# Patient Record
Sex: FEMALE | Race: WHITE | NOT HISPANIC OR LATINO | Employment: OTHER | URBAN - METROPOLITAN AREA
[De-identification: names, ages, dates, MRNs, and addresses within clinical notes are randomized per-mention and may not be internally consistent; named-entity substitution may affect disease eponyms.]

---

## 2017-01-04 LAB
EXT ALBUMIN: 3.8
EXT ALKALINE PHOSPHATASE: 61
EXT ALT: 30
EXT AST: 21
EXT BILIRUBIN TOTAL: 0.4
EXT BUN: 26
EXT CALCIUM: 9.3
EXT CHLORIDE: 107
EXT CO2: 24
EXT CREATININE: 1.2 MG/DL
EXT EOSINOPHIL%: 1.7
EXT GLUCOSE: 74
EXT HEMATOCRIT: 36.9
EXT HEMOGLOBIN: 11.7
EXT LYMPH%: 12.9
EXT MONOCYTES%: 5
EXT PLATELETS: 265
EXT POTASSIUM: 4.1
EXT PROTEIN TOTAL: 6.3
EXT SEGS%: 77.4
EXT SODIUM: 141 MMOL/L
EXT TACROLIMUS LVL: 4.4
EXT WBC: 7.1
HBV SURFACE AG SERPL QL IA: NEGATIVE
HEPATITIS B VIRAL DNA - QUANTITATIVE: NOT DETECTED

## 2017-01-09 ENCOUNTER — PATIENT MESSAGE (OUTPATIENT)
Dept: TRANSPLANT | Facility: CLINIC | Age: 52
End: 2017-01-09

## 2017-01-09 RX ORDER — TACROLIMUS 1 MG/1
CAPSULE ORAL
Qty: 810 CAPSULE | Refills: 3 | Status: SHIPPED | OUTPATIENT
Start: 2017-01-09 | End: 2017-04-28 | Stop reason: SDUPTHER

## 2017-01-09 NOTE — TELEPHONE ENCOUNTER
Informed pt labs reviewed with Dr. Barfield and are stable, instructed pt to increase prograf to 5 mg in am and 4 mg in pm. Will repeat in 4 weeks on 2/6.

## 2017-01-12 ENCOUNTER — PATIENT MESSAGE (OUTPATIENT)
Dept: TRANSPLANT | Facility: CLINIC | Age: 52
End: 2017-01-12

## 2017-01-19 ENCOUNTER — PATIENT MESSAGE (OUTPATIENT)
Dept: TRANSPLANT | Facility: CLINIC | Age: 52
End: 2017-01-19

## 2017-02-07 ENCOUNTER — PATIENT MESSAGE (OUTPATIENT)
Dept: TRANSPLANT | Facility: CLINIC | Age: 52
End: 2017-02-07

## 2017-02-15 LAB
EXT ALBUMIN: 3.9
EXT ALKALINE PHOSPHATASE: 76
EXT ALT: 28
EXT AST: 21
EXT BASOPHIL%: 1.2
EXT BILIRUBIN TOTAL: 0.5
EXT BUN: 19
EXT CALCIUM: 9.5
EXT CHLORIDE: 107
EXT CO2: 28
EXT CREATININE: 1.2 MG/DL
EXT EOSINOPHIL%: 2.6
EXT GLUCOSE: 81
EXT HBV DNA, QUALITATIVE PCR: NOT DETECTED
EXT HEMATOCRIT: 38.3
EXT HEMOGLOBIN: 12.1
EXT HEP B S AB: NORMAL
EXT LYMPH%: 24.9
EXT MONOCYTES%: 10
EXT PLATELETS: 269
EXT POTASSIUM: 4.1
EXT PROTEIN TOTAL: 6.2
EXT SEGS%: 61.3
EXT SODIUM: 143 MMOL/L
EXT TACROLIMUS LVL: 6
EXT WBC: 4.5

## 2017-02-16 ENCOUNTER — PATIENT MESSAGE (OUTPATIENT)
Dept: TRANSPLANT | Facility: CLINIC | Age: 52
End: 2017-02-16

## 2017-02-17 ENCOUNTER — TELEPHONE (OUTPATIENT)
Dept: TRANSPLANT | Facility: CLINIC | Age: 52
End: 2017-02-17

## 2017-02-17 ENCOUNTER — PATIENT MESSAGE (OUTPATIENT)
Dept: TRANSPLANT | Facility: CLINIC | Age: 52
End: 2017-02-17

## 2017-02-17 RX ORDER — PANTOPRAZOLE SODIUM 40 MG/1
40 TABLET, DELAYED RELEASE ORAL DAILY
Qty: 90 TABLET | Refills: 3 | Status: SHIPPED | OUTPATIENT
Start: 2017-02-17

## 2017-02-17 RX ORDER — TENOFOVIR DISOPROXIL FUMARATE 300 MG/1
300 TABLET, FILM COATED ORAL DAILY
Qty: 90 TABLET | Refills: 3 | Status: SHIPPED | OUTPATIENT
Start: 2017-02-17 | End: 2017-10-02 | Stop reason: SDUPTHER

## 2017-02-17 RX ORDER — MYCOPHENOLATE MOFETIL 250 MG/1
CAPSULE ORAL
Qty: 360 CAPSULE | Refills: 3 | Status: SHIPPED | OUTPATIENT
Start: 2017-02-17 | End: 2017-08-30

## 2017-02-17 NOTE — TELEPHONE ENCOUNTER
----- Message from Roseanne Barfield MD sent at 2/16/2017  4:58 PM CST -----  The Labs are stable - please let patient know.

## 2017-02-21 ENCOUNTER — TELEPHONE (OUTPATIENT)
Dept: TRANSPLANT | Facility: CLINIC | Age: 52
End: 2017-02-21

## 2017-02-21 NOTE — TELEPHONE ENCOUNTER
----- Message from Kim Robison sent at 2/21/2017  2:07 PM CST -----  Contact: pt  Wants to know if she is ok to start looking for work again please call her @ # [phone].

## 2017-03-06 ENCOUNTER — OFFICE VISIT (OUTPATIENT)
Dept: TRANSPLANT | Facility: CLINIC | Age: 52
End: 2017-03-06
Payer: MEDICAID

## 2017-03-06 ENCOUNTER — HOSPITAL ENCOUNTER (OUTPATIENT)
Dept: RADIOLOGY | Facility: HOSPITAL | Age: 52
Discharge: HOME | End: 2017-03-06
Attending: INTERNAL MEDICINE
Payer: MEDICAID

## 2017-03-06 VITALS
HEART RATE: 68 BPM | HEIGHT: 64 IN | WEIGHT: 117.75 LBS | RESPIRATION RATE: 18 BRPM | DIASTOLIC BLOOD PRESSURE: 78 MMHG | BODY MASS INDEX: 20.1 KG/M2 | SYSTOLIC BLOOD PRESSURE: 114 MMHG | OXYGEN SATURATION: 97 % | TEMPERATURE: 98 F

## 2017-03-06 DIAGNOSIS — Z94.4 LIVER REPLACED BY TRANSPLANT: ICD-10-CM

## 2017-03-06 DIAGNOSIS — G62.9 NEUROPATHY: ICD-10-CM

## 2017-03-06 DIAGNOSIS — B18.1 CHRONIC HEPATITIS B: ICD-10-CM

## 2017-03-06 DIAGNOSIS — Z94.4 LIVER REPLACED BY TRANSPLANT: Chronic | ICD-10-CM

## 2017-03-06 DIAGNOSIS — Z94.4 S/P LIVER TRANSPLANT: Primary | ICD-10-CM

## 2017-03-06 DIAGNOSIS — Z29.89 PROPHYLACTIC IMMUNOTHERAPY: Chronic | ICD-10-CM

## 2017-03-06 DIAGNOSIS — K83.8 DILATED BILE DUCT: ICD-10-CM

## 2017-03-06 PROCEDURE — 99999 PR PBB SHADOW E&M-EST. PATIENT-LVL IV: CPT | Mod: PBBFAC,,, | Performed by: INTERNAL MEDICINE

## 2017-03-06 PROCEDURE — 99214 OFFICE O/P EST MOD 30 MIN: CPT | Mod: PBBFAC,,25 | Performed by: INTERNAL MEDICINE

## 2017-03-06 PROCEDURE — 99215 OFFICE O/P EST HI 40 MIN: CPT | Mod: S$PBB,,, | Performed by: INTERNAL MEDICINE

## 2017-03-06 PROCEDURE — 76705 ECHO EXAM OF ABDOMEN: CPT | Mod: 26,59,, | Performed by: RADIOLOGY

## 2017-03-06 PROCEDURE — 93975 VASCULAR STUDY: CPT | Mod: 26,XE,, | Performed by: RADIOLOGY

## 2017-03-06 RX ORDER — GABAPENTIN 800 MG/1
TABLET ORAL
Refills: 2 | COMMUNITY
Start: 2017-02-17

## 2017-03-06 RX ORDER — CYCLOBENZAPRINE HCL 10 MG
TABLET ORAL
Refills: 2 | COMMUNITY
Start: 2017-01-19

## 2017-03-06 RX ORDER — CLONAZEPAM 0.5 MG/1
0.5 TABLET ORAL 2 TIMES DAILY
COMMUNITY

## 2017-03-06 NOTE — LETTER
March 12, 2017        Tracie Armenta  [address] [place] [address]  Phone: [phone]  Fax: [phone]             [facility]  [address]  [facility] [address]  Phone: [phone]   Patient: Gi Waters   MR Number: [id number]   YOB: 1965   Date of Visit: 3/6/2017       Dear Dr. Tracie Armenta    Thank you for referring Gi Waters to me for evaluation. Attached you will find relevant portions of my assessment and plan of care.    If you have questions, please do not hesitate to call me. I look forward to following Gi Waters along with you.    Sincerely,    Roseanne Barfield MD    Enclosure    If you would like to receive this communication electronically, please contact [email or web address] or [phone] to request Appwapp Link access.    App[employer] Link is a tool which provides read-only access to select patient information with whom you have a relationship. Its easy to use and provides real time access to review your patients record including encounter summaries, notes, results, and demographic information.    If you feel you have received this communication in error or would no longer like to receive these types of communications, please e-mail [email or web address]

## 2017-03-06 NOTE — PATIENT INSTRUCTIONS
1. Repeat u/s with doppler in 3 months  2. Add hemoglobin A1 c to next bloodwork  3. Bone density next year  4. Continue regular mammograms  5. To see dermatology  6. Had colonoscopy in 2017 (normal by report)-repeat in 2027  7. Continue off metoprolol  8. Will consider changing to Vemlidy in the future  Return 3 months

## 2017-03-06 NOTE — MR AVS SNAPSHOT
[facility]  [address]  [facility] [address]  Phone: [phone]                  Gi Waters   3/6/2017 2:30 PM   Office Visit    Description:  Female : 1965   Provider:  Roseanne Barfield MD   Department:  [facility]           Reason for Visit     Liver Transplant Follow-up           Diagnoses this Visit        Comments    S/P liver transplant    -  Primary            To Do List           Goals (5 Years of Data)     None      [facility] On Call     [facility] On Call Nurse Care Line -  Assistance  Registered nurses in the [facility] On Call Center provide clinical advisement, health education, appointment booking, and other advisory services.  Call for this free service at [phone].             Medications           Message regarding Medications     Verify the changes and/or additions to your medication regime listed below are the same as discussed with your clinician today.  If any of these changes or additions are incorrect, please notify your healthcare provider.        STOP taking these medications     hepatitis B (Hepagam B) 10,000 Units     hepatitis B (Hepagam B) 10,000 Units in sodium chloride 0.9%     hepatitis B (Hepagam B) 10,000 Units in sodium chloride 0.9%     hepatitis B (Hepagam B) 10,000 Units in sodium chloride 0.9% bolus     hepatitis B (Hepagam B) 10,000 Units, sodium chloride 0.9%     hepatitis B 10,000 units per 250ml NS     hydrOXYzine pamoate (VISTARIL) 25 MG Cap TAKE 1 CAPSULE BY MOUTH UP TO 3 TIMES A DAY AS NEEDED FOR ANXIETY thank youmike -)    metoprolol tartrate (LOPRESSOR) 50 MG tablet TAKE 1 TABLET TWICE A DAY           Verify that the below list of medications is an accurate representation of the medications you are currently taking.  If none reported, the list may be blank. If incorrect, please contact your healthcare provider. Carry this list with you in case of emergency.           Current Medications      "amlodipine (NORVASC) 10 MG tablet TAKE 1 TABLET DAILY    aspirin 325 MG tablet Take 325 mg by mouth once daily.    buPROPion (WELLBUTRIN) 100 MG tablet Take 150 mg by mouth once daily.     clonazePAM (KLONOPIN) 0.5 MG tablet Take 0.5 mg by mouth 2 (two) times daily.    cyclobenzaprine (FLEXERIL) 10 MG tablet TK 1 T PO Qhs prn SPASMS OR PAIN    fexofenadine (ALLEGRA) 60 MG tablet Take 180 mg by mouth once daily.     gabapentin (NEURONTIN) 800 MG tablet TK 1 T PO TID    multivitamin capsule Take 1 capsule by mouth once daily.    mycophenolate (CELLCEPT) 250 mg Cap TAKE 2 CAPSULES (500 MG TOTAL) TWICE A DAY    pantoprazole (PROTONIX) 40 MG tablet Take 1 tablet (40 mg total) by mouth once daily.    tacrolimus (PROGRAF) 1 MG Cap 5 mg in am and 4 mg in pm    tenofovir (VIREAD) 300 mg Tab Take 1 tablet (300 mg total) by mouth once daily.    trazodone (DESYREL) 50 MG tablet Take 100 mg by mouth every evening.     sitagliptin (JANUVIA) 50 MG Tab Take 1 tablet (50 mg total) by mouth once daily.           Clinical Reference Information           Your Vitals Were     BP Pulse Temp Resp Height Weight    114/78 (BP Location: Right arm, Patient Position: Sitting, BP Method: Automatic) 68 97.7 °F (36.5 °C) (Oral) 18 5' 4" (1.626 m) 53.4 kg (117 lb 11.6 oz)    SpO2 BMI             97% 20.21 kg/m2         Blood Pressure          Most Recent Value    BP  114/78      Allergies as of 3/6/2017     Amoxicillin    Codeine    Penicillins    Ultram [Tramadol]      Immunizations Administered on Date of Encounter - 3/6/2017     None      Orders Placed During Today's Visit     Future Labs/Procedures Expected by Expires    US Liver Transplant Post  6/6/2017 3/6/2018      Maintenance Dialysis History     Patient has no recorded history of maintenance dialysis.      Transplant Information        Txp Date Organ Coordinator Care Team    3/15/2015 Liver Comfort [facility], [facility] Referring Physician:  Tracie Armenta MD   Current Hepatologist:  " Roseanne Barfield MD   Surgeon:  Yaima Dodson MD   Assisting Surgeon:  Alen Basurto MD   Corresponding Physician:  Tracie Armenta MD         Instructions    1. Repeat u/s with doppler in 3 months  2. Add hemoglobin A1 c to next bloodwork  3. Bone density next year  4. Continue regular mammograms  5. To see dermatology  6. Had colonoscopy in 2017 (normal by report)-repeat in 2027  7. Continue off metoprolol  8. Will consider changing to Vemlidy in the future  Return 3 months       Language Assistance Services     ATTENTION: Language assistance services are available, free of charge. Please call [phone].      ATENCIÓN: Si habla forest, tiene a flowers disposición servicios gratuitos de asistencia lingüística. Llame al [phone].     CHÚ Ý: N?u b?n nói Ti?ng Vi?t, có các d?ch v? h? tr? ngôn ng? mi?n phí dành cho b?n. G?i s? [phone].         [facility] complies with applicable Federal civil rights laws and does not discriminate on the basis of race, color, national origin, age, disability, or sex.

## 2017-03-06 NOTE — PROGRESS NOTES
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    ORGAN: LIVER  Whole or Partial: whole liver  Donor Type:  - brain death  CDC High Risk: no  Donor CMV Status: positive  Donor HCV Status: negative  Donor HBcAb: negative  Biliary Anastomosis: end to end  Arterial Anatomy: completely replaced circulation  IVC reconstruction: end to end ivc  Portal vein status: patent    She has had the following complications since transplant: none    Subjective:     Interval History: Gi is now 2 years post liver transplant. Currently, she is doing well.  Liver enzymes are low. She is on prograf and cellcept.     An MRCP was done previously for mildly dilated ducts both extra and intra hepatic. Because her enzymes were normal, no ERCP was recommended. Labs remain normal.    U/s with doppler in 17 shows stable dilation of the ducts and with an interval increase in hepatic arterial resistive indices, now measuring at the upper limits of normal.     Had a colonoscopy this year that was normal by report. Repeat recommended in .    O2 saturation has improved compared to last visit.    Review of Systems   Constitutional: Negative.    HENT: Negative.    Eyes: Negative.    Respiratory: Negative.    Cardiovascular: Negative.    Genitourinary: Negative.    Musculoskeletal: Negative.    Skin: Negative.    Neurological: Negative.    Psychiatric/Behavioral: Negative.        Objective:     Physical Exam   Constitutional: She is oriented to person, place, and time. She appears well-developed and well-nourished.   HENT:   Head: Normocephalic and atraumatic.   Eyes: Conjunctivae and EOM are normal. Pupils are equal, round, and reactive to light. No scleral icterus.   Neck: Normal range of motion. Neck supple. No thyromegaly present.    Cardiovascular: Normal rate, regular rhythm and normal heart sounds.    Pulmonary/Chest: Effort normal and breath sounds normal. She has no rales.   Abdominal: Soft. Bowel sounds are normal. She exhibits no distension and no mass. There is no tenderness.   Musculoskeletal: Normal range of motion. She exhibits no edema.   Neurological: She is alert and oriented to person, place, and time.   Skin: Skin is warm and dry. No rash noted.   Psychiatric: She has a normal mood and affect.   Vitals reviewed.    Lab Results   Component Value Date    BILITOT 0.4 05/30/2016    AST 26 05/30/2016    ALT 33 05/30/2016    ALKPHOS 58 05/30/2016    CREATININE 1.4 05/30/2016    ALBUMIN 3.8 05/30/2016     Lab Results   Component Value Date    WBC 5.78 05/30/2016    HGB 11.6 (L) 05/30/2016    HCT 36.4 (L) 05/30/2016    HCT 28 (L) 03/15/2015     05/30/2016     Lab Results   Component Value Date    TACROLIMUS 10.5 05/30/2016       Assessment/Plan:       The patient is now 2 years post liver transplant with imporved allograft function. My current recommendations:  1. Nausea.-improved monitor.  2. Headaches: improved- monitor  3. Elevated liver tests: improved-monitor  4. S/p liver transplant and control of IS: continue: prograf and cellcept. Will not attempt to taper off cellcept -will try to lower prograf in an effort to protect the kidneys. Target trough of 3-4  5. Dilated ducts: stable and no elevated ALKP- no ercp for now.   6. HBV: undetectable viral load 11/15: continue viread; now off HBIG per protocol; change viread to tenofovir alafenamide.  7. Hair thinning: likely prograf related-stable- monitor  8. Repeat bone density next year.  9. DOUGLAS: improved on lower prograf: likely Continue prograf and continue cellcept.   10.  Health maintenance: to see a dermatologist annually to screen for skin cancer, perform regular colonoscopies (due next in 2027)  and mammograms. She has had a hysterectomy and has no cervix. Therefore no  need for a pap. Check Hemoglobin A1c with next bloodwork.  11. U/s with doppler: increase resistive indices in HA- repeat 3 months.  12. Hypoxemia: resolved- monitor    Return 3 months    Roseanne Barfield MD           [facility] Patient Status  Functional Status: 90% - Able to carry on normal activity: minor symptoms of disease  Physical Capacity: Limited Mobility        . . . . .

## 2017-03-09 ENCOUNTER — TELEPHONE (OUTPATIENT)
Dept: TRANSPLANT | Facility: CLINIC | Age: 52
End: 2017-03-09

## 2017-03-09 DIAGNOSIS — Z94.4 LIVER REPLACED BY TRANSPLANT: Primary | ICD-10-CM

## 2017-03-09 NOTE — TELEPHONE ENCOUNTER
----- Message from Roseanne Barfield MD sent at 3/9/2017 12:29 PM CST -----  Repeat in 3 months - please let patient know.

## 2017-03-28 ENCOUNTER — PATIENT MESSAGE (OUTPATIENT)
Dept: TRANSPLANT | Facility: CLINIC | Age: 52
End: 2017-03-28

## 2017-03-28 ENCOUNTER — TELEPHONE (OUTPATIENT)
Dept: DERMATOLOGY | Facility: CLINIC | Age: 52
End: 2017-03-28

## 2017-03-28 NOTE — TELEPHONE ENCOUNTER
Spoke with pt scheduled appt.      ----- Message from Vandana Couch sent at 3/28/2017 12:40 PM CDT -----  Contact: pt  Ke-Pt is a liver transplant pt and needs an yearly skin ck.  Can be reached at [phone] her cell.  Leave message if she is not there.  Pt lives 5 hours away.  Will be in the clinic on June 6 for a ultrasound at 1015.  Would like to have done that day.

## 2017-04-13 ENCOUNTER — PATIENT MESSAGE (OUTPATIENT)
Dept: TRANSPLANT | Facility: CLINIC | Age: 52
End: 2017-04-13

## 2017-04-18 ENCOUNTER — TELEPHONE (OUTPATIENT)
Dept: TRANSPLANT | Facility: CLINIC | Age: 52
End: 2017-04-18

## 2017-04-18 LAB
EXT ALBUMIN: 4
EXT ALKALINE PHOSPHATASE: 87
EXT ALT: 35
EXT AST: 26
EXT BILIRUBIN TOTAL: 0.3
EXT BUN: 20
EXT CALCIUM: 9.6
EXT CHLORIDE: 109
EXT CO2: 25
EXT CREATININE: 1.2 MG/DL
EXT EOSINOPHIL%: 2.1
EXT GLUCOSE: 102
EXT HBV DNA, QUALITATIVE PCR: NOT DETECTED
EXT HEMATOCRIT: 41.5
EXT HEMOGLOBIN: 13.5
EXT HEP B S AG: NON REACTIVE
EXT LYMPH%: 18.1
EXT MONOCYTES%: 8.7
EXT PLATELETS: 290
EXT POTASSIUM: 3.7
EXT PROTEIN TOTAL: 6.7
EXT SEGS%: 69.8
EXT SODIUM: 146 MMOL/L
EXT TACROLIMUS LVL: 6.8
EXT WBC: 6

## 2017-04-18 NOTE — TELEPHONE ENCOUNTER
Pt calling to report new migraines with increased stressed. Pt states she was seen in the ER but was discharged. Advised pt to follow up with PCP ASAP for treatment. She verbalizes understanding.

## 2017-04-18 NOTE — TELEPHONE ENCOUNTER
----- Message from Yobani Cain sent at 4/18/2017  4:49 PM CDT -----  Contact: pt   Says that she has been having migraines and nausea for the past 4 days, please call   [phone]

## 2017-04-21 ENCOUNTER — PATIENT MESSAGE (OUTPATIENT)
Dept: TRANSPLANT | Facility: CLINIC | Age: 52
End: 2017-04-21

## 2017-04-27 ENCOUNTER — PATIENT MESSAGE (OUTPATIENT)
Dept: TRANSPLANT | Facility: CLINIC | Age: 52
End: 2017-04-27

## 2017-04-28 RX ORDER — TACROLIMUS 1 MG/1
CAPSULE ORAL
Qty: 810 CAPSULE | Refills: 3 | Status: SHIPPED | OUTPATIENT
Start: 2017-04-28 | End: 2017-11-29 | Stop reason: SDUPTHER

## 2017-05-01 ENCOUNTER — PATIENT MESSAGE (OUTPATIENT)
Dept: TRANSPLANT | Facility: CLINIC | Age: 52
End: 2017-05-01

## 2017-05-01 ENCOUNTER — TELEPHONE (OUTPATIENT)
Dept: TRANSPLANT | Facility: CLINIC | Age: 52
End: 2017-05-01

## 2017-05-01 NOTE — TELEPHONE ENCOUNTER
----- Message from Roseanne Barfield MD sent at 4/30/2017 11:26 AM CDT -----  The Labs are stable - please let patient know.

## 2017-05-24 ENCOUNTER — PATIENT MESSAGE (OUTPATIENT)
Dept: TRANSPLANT | Facility: CLINIC | Age: 52
End: 2017-05-24

## 2017-05-24 RX ORDER — ARIPIPRAZOLE 10 MG/1
10 TABLET ORAL DAILY
COMMUNITY

## 2017-06-02 ENCOUNTER — TELEPHONE (OUTPATIENT)
Dept: TRANSPLANT | Facility: CLINIC | Age: 52
End: 2017-06-02

## 2017-06-02 NOTE — TELEPHONE ENCOUNTER
----- Message from Nica Jorge sent at 6/2/2017  9:32 AM CDT -----  Contact: patient   Requesting a new script would rather discuss with you which script she needs please call

## 2017-06-02 NOTE — TELEPHONE ENCOUNTER
Pt calling asking for pain medication for chronic back pain. Advised pt this is not handled by liver transplant and she would need to address it with her PCP. She verbalizes understanding.

## 2017-06-06 ENCOUNTER — OFFICE VISIT (OUTPATIENT)
Dept: TRANSPLANT | Facility: CLINIC | Age: 52
End: 2017-06-06
Payer: MEDICAID

## 2017-06-06 ENCOUNTER — OFFICE VISIT (OUTPATIENT)
Dept: DERMATOLOGY | Facility: CLINIC | Age: 52
End: 2017-06-06
Payer: MEDICAID

## 2017-06-06 ENCOUNTER — HOSPITAL ENCOUNTER (OUTPATIENT)
Dept: RADIOLOGY | Facility: HOSPITAL | Age: 52
Discharge: HOME | End: 2017-06-06
Attending: INTERNAL MEDICINE
Payer: MEDICAID

## 2017-06-06 VITALS
HEIGHT: 64 IN | BODY MASS INDEX: 20.28 KG/M2 | OXYGEN SATURATION: 95 % | SYSTOLIC BLOOD PRESSURE: 128 MMHG | TEMPERATURE: 98 F | RESPIRATION RATE: 18 BRPM | HEART RATE: 67 BPM | DIASTOLIC BLOOD PRESSURE: 94 MMHG | WEIGHT: 118.81 LBS

## 2017-06-06 DIAGNOSIS — Z94.4 LIVER REPLACED BY TRANSPLANT: ICD-10-CM

## 2017-06-06 DIAGNOSIS — B18.1 CHRONIC HEPATITIS B: ICD-10-CM

## 2017-06-06 DIAGNOSIS — D69.2 PURPURA: ICD-10-CM

## 2017-06-06 DIAGNOSIS — L30.9 ECZEMA, UNSPECIFIED TYPE: Primary | ICD-10-CM

## 2017-06-06 DIAGNOSIS — Z29.89 PROPHYLACTIC IMMUNOTHERAPY: ICD-10-CM

## 2017-06-06 DIAGNOSIS — Z29.89 PROPHYLACTIC IMMUNOTHERAPY: Chronic | ICD-10-CM

## 2017-06-06 DIAGNOSIS — D18.00 ANGIOMA: ICD-10-CM

## 2017-06-06 DIAGNOSIS — Z94.4 LIVER REPLACED BY TRANSPLANT: Primary | Chronic | ICD-10-CM

## 2017-06-06 DIAGNOSIS — K83.8 DILATED BILE DUCT: ICD-10-CM

## 2017-06-06 PROCEDURE — 99214 OFFICE O/P EST MOD 30 MIN: CPT | Mod: PBBFAC,25 | Performed by: NURSE PRACTITIONER

## 2017-06-06 PROCEDURE — 76705 ECHO EXAM OF ABDOMEN: CPT | Mod: 26,59,, | Performed by: RADIOLOGY

## 2017-06-06 PROCEDURE — 99213 OFFICE O/P EST LOW 20 MIN: CPT | Mod: S$PBB,,, | Performed by: NURSE PRACTITIONER

## 2017-06-06 PROCEDURE — 99999 PR PBB SHADOW E&M-EST. PATIENT-LVL IV: CPT | Mod: PBBFAC,,, | Performed by: NURSE PRACTITIONER

## 2017-06-06 PROCEDURE — 93975 VASCULAR STUDY: CPT | Mod: 26,XE,, | Performed by: RADIOLOGY

## 2017-06-06 PROCEDURE — 99999 PR PBB SHADOW E&M-EST. PATIENT-LVL II: CPT | Mod: PBBFAC,,, | Performed by: DERMATOLOGY

## 2017-06-06 PROCEDURE — 99203 OFFICE O/P NEW LOW 30 MIN: CPT | Mod: S$PBB,,, | Performed by: DERMATOLOGY

## 2017-06-06 RX ORDER — TRIAMCINOLONE ACETONIDE 1 MG/ML
LOTION TOPICAL
Refills: 0 | COMMUNITY
Start: 2017-05-09

## 2017-06-06 RX ORDER — BACLOFEN 10 MG/1
TABLET ORAL
Refills: 1 | COMMUNITY
Start: 2017-05-26 | End: 2017-06-06

## 2017-06-06 RX ORDER — MINERAL OIL
ENEMA (ML) RECTAL
Refills: 5 | COMMUNITY
Start: 2017-03-23

## 2017-06-06 RX ORDER — BUPROPION HYDROCHLORIDE 150 MG/1
TABLET ORAL
Refills: 1 | COMMUNITY
Start: 2017-05-15 | End: 2017-08-08

## 2017-06-06 RX ORDER — TRAZODONE HYDROCHLORIDE 100 MG/1
TABLET ORAL
Refills: 1 | COMMUNITY
Start: 2017-05-15

## 2017-06-06 RX ORDER — METHOCARBAMOL 750 MG/1
TABLET, FILM COATED ORAL
Refills: 2 | COMMUNITY
Start: 2017-06-02

## 2017-06-06 RX ORDER — CETIRIZINE HYDROCHLORIDE 10 MG/1
TABLET ORAL
Refills: 5 | COMMUNITY
Start: 2017-05-15

## 2017-06-06 RX ORDER — FLUOCINONIDE 0.5 MG/G
CREAM TOPICAL 2 TIMES DAILY
Qty: 60 G | Refills: 2 | Status: SHIPPED | OUTPATIENT
Start: 2017-06-06

## 2017-06-06 NOTE — PROGRESS NOTES
Subjective:       Patient ID:  Gi Waters is a 51 y.o. female who presents for   Chief Complaint   Patient presents with    Skin Check     TBSE, rash on shoulders, arms, ankles, x yrs, recurrent, drainage, tx prednisone & TAC cream     HPI  52 yo F presents for skin check and evaluation of a rash.  The rash has been present x many years, involving trunk and extremities, flares intermittently, previous Tx include prednisone and TAC.  She uses Dove cleanser, CeraVe moisturizer, Tide free and clear    Derm Hx: Denies personal h/o skin cancer; H/o eczema as a child; high risk for development of skin cancer due to immunosuppression s/p liver transplant 3/15/15 for acute HBV    Past Medical History:   Diagnosis Date    Allergy     seasonal    Bipolar 1 disorder     Blood clotting tendency     Hair thinning 7/27/2015    History of cholecystectomy 3/12/2015    History of gastric bypass 3/12/2015    Hypertension     Rib fracture 10/26/2015    Ulcer      Review of Systems   Constitutional: Negative for fever, chills, weight loss, fatigue, night sweats and malaise.   Skin: Positive for activity-related sunscreen use. Negative for daily sunscreen use and recent sunburn.   Hematologic/Lymphatic: Bruises/bleeds easily.        Objective:    Physical Exam   Constitutional: She appears well-developed and well-nourished. No distress.   Neurological: She is alert and oriented to person, place, and time. She is not disoriented.   Psychiatric: She has a normal mood and affect.   Skin:   Areas Examined (abnormalities noted in diagram):   Scalp / Hair Palpated and Inspected  Head / Face Inspection Performed  Neck Inspection Performed  Chest / Axilla Inspection Performed  Abdomen Inspection Performed  Genitals / Buttocks / Groin Inspection Performed  Back Inspection Performed  RUE Inspected  LUE Inspection Performed  RLE Inspected  LLE Inspection Performed  Nails and Digits Inspection Performed              Diagram  Legend     Erythematous scaling macule/papule c/w actinic keratosis       Vascular papule c/w angioma      Pigmented verrucoid papule/plaque c/w seborrheic keratosis      Yellow umbilicated papule c/w sebaceous hyperplasia      Irregularly shaped tan macule c/w lentigo     1-2 mm smooth white papules consistent with Milia      Movable subcutaneous cyst with punctum c/w epidermal inclusion cyst      Subcutaneous movable cyst c/w pilar cyst      Firm pink to brown papule c/w dermatofibroma      Pedunculated fleshy papule(s) c/w skin tag(s)      Evenly pigmented macule c/w junctional nevus     Mildly variegated pigmented, slightly irregular-bordered macule c/w mildly atypical nevus      Flesh colored to evenly pigmented papule c/w intradermal nevus       Pink pearly papule/plaque c/w basal cell carcinoma      Erythematous hyperkeratotic cursted plaque c/w SCC      Surgical scar with no sign of skin cancer recurrence      Open and closed comedones      Inflammatory papules and pustules      Verrucoid papule consistent consistent with wart     Erythematous eczematous patches and plaques     Dystrophic onycholytic nail with subungual debris c/w onychomycosis     Umbilicated papule    Erythematous-base heme-crusted tan verrucoid plaque consistent with inflamed seborrheic keratosis     Erythematous Silvery Scaling Plaque c/w Psoriasis     See annotation      Assessment / Plan:        Eczema, unspecified type  Encouraged liberal use of moisturizer as this can contribute to pruritis  I discussed the side effects of topical steroids including atrophy, telangiectasias, striae.  Avoid use on the face and contact with the eyes  Continue gentle skin care routine  -     fluocinonide 0.05% (LIDEX) 0.05 % cream; Apply topically 2 (two) times daily.  Dispense: 60 g; Refill: 2    Angioma  This is a benign vascular lesion. Reassurance given. No treatment required.     Purpura  Discussed that the bruising is likely a combination of sun  damage and age-related atrophy.      Liver transplant 3/15/2015 for acute HBV/Prophylactic immunotherapy (transplant immunosuppression)  Discussed sun protection and increased risk of skin cancer in organ transplant patients was provided to the patient  Will provide [facility] brochure to patient at follow up for eczema           Return in about 6 weeks (around 7/18/2017).

## 2017-06-06 NOTE — Clinical Note
Trough 6.8, recommend decreasing tacro 4/4 if Dr. Barfield in agreement.  Patient will need to be notified of changes  rtc 6 months w/ Dr. Barfield

## 2017-06-06 NOTE — PROGRESS NOTES
Transplant Hepatology  Liver Transplant Recipient Follow-up    Transplant Date: 3/15/2015  [facility] Native Liver Dx: PHILIP: Type B, HBsAG+    Gi is here for follow up of her liver transplant.    ORGAN: LIVER  Whole or Partial: whole liver  Donor Type:  - brain death  CDC High Risk: no  Donor CMV Status: Positive  Donor HCV Status: Negative  Donor HBcAb: Negative  Biliary Anastomosis: end to end  Arterial Anatomy: completely replaced circulation  IVC reconstruction: end to end ivc  Portal vein status: patent    She has had the following complications since transplant: none.     Subjective:     Interval History: Gi was last seen on 3/6/17. Currently, she is doing well. Current complaints include none. She states she requested this appointment in order to review and discuss her imaging performed today    She has good allograft function, maintained on tacrolimus + Cellcept, q/ most recent trough 6.8    tenofovir for HBV suppression     -An MRCP was done previously for mildly dilated ducts both extra and intra hepatic. Because her enzymes were normal, no ERCP was recommended. Labs remain normal.     U/S with doppler in 17 showed stable dilation of the ducts and with an interval increase in hepatic arterial resistive indices, now measuring at the upper limits of normal.   US today is stable when compared to March    - DOUGLAS improved on lower prograf    US :  Satisfactory evaluation of the transplant liver with ultrasound and Doppler ultrasound.    2. Continued extrahepatic biliary ductal dilatation of the common bile duct stable from prior examination    Review of Systems   Constitutional: Negative for activity change, appetite change, chills, diaphoresis, fatigue, fever and unexpected weight change.   HENT: Negative for facial swelling and nosebleeds.    Respiratory: Negative for cough, chest tightness and shortness of breath.    Cardiovascular: Negative for chest pain, palpitations and leg swelling.    Gastrointestinal: Negative for abdominal distention, abdominal pain, blood in stool, constipation, diarrhea, nausea and vomiting.   Musculoskeletal: Negative for neck pain and neck stiffness.   Skin: Negative for color change, pallor and rash.   Neurological: Negative for dizziness, tremors, syncope, weakness, light-headedness and headaches.   Hematological: Negative for adenopathy. Does not bruise/bleed easily.   Psychiatric/Behavioral: Negative for agitation, behavioral problems, confusion and decreased concentration.       Objective:     Physical Exam   Constitutional: She is oriented to person, place, and time. She appears well-developed and well-nourished. No distress.   HENT:   Head: Normocephalic and atraumatic.   Eyes: Conjunctivae are normal. Pupils are equal, round, and reactive to light. No scleral icterus.   Neck: Normal range of motion. Neck supple.   Cardiovascular: Normal rate.    Pulmonary/Chest: Effort normal.   Abdominal: Soft. She exhibits no distension and no mass. There is no tenderness. There is no rebound and no guarding.   Musculoskeletal: Normal range of motion.   Neurological: She is alert and oriented to person, place, and time. No cranial nerve deficit. She exhibits normal muscle tone. Coordination normal.   No asterixis   Skin: Skin is warm and dry. No rash noted. She is not diaphoretic. No erythema. No pallor.   Psychiatric: She has a normal mood and affect. Her behavior is normal. Judgment and thought content normal.     Lab Results   Component Value Date    BILITOT 0.4 05/30/2016    AST 26 05/30/2016    ALT 33 05/30/2016    ALKPHOS 58 05/30/2016    CREATININE 1.4 05/30/2016    ALBUMIN 3.8 05/30/2016     Lab Results   Component Value Date    WBC 5.78 05/30/2016    HGB 11.6 (L) 05/30/2016    HCT 36.4 (L) 05/30/2016    HCT 28 (L) 03/15/2015     05/30/2016     Lab Results   Component Value Date    TACROLIMUS 10.5 05/30/2016       Assessment/Plan:     1. Liver transplant 3/15/2015  for acute HBV    2. Prophylactic immunotherapy (transplant immunosuppression)    3. Chronic hepatitis B    4. Dilated bile duct        S/P liver transplant due to HBV: Normal LFTs. Continue monitoring symptoms, labs and drug levels for drug-related toxicity and side effects  -continue with post transplant labs per protocol  IS: Chronic immunosuppressive medications for rejection prophylaxis at target.   -recommend decreasing tacrolimus to 4/4 if staff in agreement  Maintenance:  -Instructed to f/u with PCP for regular health maintenance care including cancer screenings and BMD  -Reviewed need to avoid sun exposure with use of sunblock, hats, long sleeves related to increased risk of skin cancers  -Recommend f/u with Dermatology for annual skin checks    RTC 6 months    Leta Maravilla NP           [facility] Patient Status  Functional Status: 100% - Normal, no complaints, no evidence of disease  Physical Capacity: No Limitations

## 2017-06-06 NOTE — Clinical Note
Trough 6.8, recommend decreasing tacro to 4/4 if staff in agreement Patient will need to be notified of changes  rtc 6 months w/ Dr. Barfield

## 2017-06-06 NOTE — LETTER
June 6, 2017        Tracie Armenta  [address] [place] [address]  Phone: [phone]  Fax: [phone]             [facility]  [address]  [facility] [address]  Phone: [phone]   Patient: Gi Waters   MR Number: [id number]   YOB: 1965   Date of Visit: 6/6/2017       Dear Dr. Tracie Armenta    Thank you for referring Gi Waters to me for evaluation. Attached you will find relevant portions of my assessment and plan of care.    If you have questions, please do not hesitate to call me. I look forward to following Gi Watres along with you.    Sincerely,    Leta Maravilla NP    Enclosure    If you would like to receive this communication electronically, please contact [email or web address] or [phone] to request [employer] access.    [employer] is a tool which provides read-only access to select patient information with whom you have a relationship. Its easy to use and provides real time access to review your patients record including encounter summaries, notes, results, and demographic information.    If you feel you have received this communication in error or would no longer like to receive these types of communications, please e-mail [email or web address]

## 2017-06-07 ENCOUNTER — PATIENT MESSAGE (OUTPATIENT)
Dept: TRANSPLANT | Facility: CLINIC | Age: 52
End: 2017-06-07

## 2017-06-07 ENCOUNTER — TELEPHONE (OUTPATIENT)
Dept: TRANSPLANT | Facility: CLINIC | Age: 52
End: 2017-06-07

## 2017-06-07 NOTE — TELEPHONE ENCOUNTER
----- Message from Roseanne Barfield MD sent at 6/6/2017  9:29 PM CDT -----  U/s  stable - please let patient know.

## 2017-06-13 ENCOUNTER — PATIENT MESSAGE (OUTPATIENT)
Dept: TRANSPLANT | Facility: CLINIC | Age: 52
End: 2017-06-13

## 2017-06-22 ENCOUNTER — PATIENT MESSAGE (OUTPATIENT)
Dept: DERMATOLOGY | Facility: CLINIC | Age: 52
End: 2017-06-22

## 2017-07-13 ENCOUNTER — PATIENT MESSAGE (OUTPATIENT)
Dept: TRANSPLANT | Facility: CLINIC | Age: 52
End: 2017-07-13

## 2017-07-13 ENCOUNTER — PATIENT MESSAGE (OUTPATIENT)
Dept: DERMATOLOGY | Facility: CLINIC | Age: 52
End: 2017-07-13

## 2017-07-17 LAB
EXT ALBUMIN: 3.8
EXT ALKALINE PHOSPHATASE: 92
EXT ALT: 28
EXT AST: 24
EXT BILIRUBIN TOTAL: 0.4
EXT BUN: 22
EXT CALCIUM: 9.5
EXT CHLORIDE: 108
EXT CO2: 26
EXT CREATININE: 1.2 MG/DL
EXT EOSINOPHIL%: 3.4
EXT GLUCOSE: 96
EXT HBV DNA, QUALITATIVE PCR: NOT DETECTED
EXT HEMATOCRIT: 40.6
EXT HEMOGLOBIN: 12.8
EXT HEP B S AG: NEGATIVE
EXT LYMPH%: 29.7
EXT MONOCYTES%: 11.6
EXT PLATELETS: 277
EXT POTASSIUM: 4.4
EXT PROTEIN TOTAL: 6.5
EXT SEGS%: 54.5
EXT SODIUM: 143 MMOL/L
EXT TACROLIMUS LVL: 5.8
EXT WBC: 4.2

## 2017-07-18 ENCOUNTER — PATIENT MESSAGE (OUTPATIENT)
Dept: TRANSPLANT | Facility: CLINIC | Age: 52
End: 2017-07-18

## 2017-07-24 ENCOUNTER — PATIENT MESSAGE (OUTPATIENT)
Dept: TRANSPLANT | Facility: CLINIC | Age: 52
End: 2017-07-24

## 2017-07-25 ENCOUNTER — PATIENT MESSAGE (OUTPATIENT)
Dept: TRANSPLANT | Facility: CLINIC | Age: 52
End: 2017-07-25

## 2017-07-26 ENCOUNTER — TELEPHONE (OUTPATIENT)
Dept: TRANSPLANT | Facility: CLINIC | Age: 52
End: 2017-07-26

## 2017-07-26 ENCOUNTER — PATIENT MESSAGE (OUTPATIENT)
Dept: TRANSPLANT | Facility: CLINIC | Age: 52
End: 2017-07-26

## 2017-07-26 RX ORDER — ASPIRIN 81 MG/1
81 TABLET ORAL DAILY
COMMUNITY

## 2017-07-26 NOTE — TELEPHONE ENCOUNTER
----- Message from Roseanne Barfield MD sent at 7/24/2017  7:32 PM CDT -----  The Labs are stable - please let patient know.

## 2017-07-26 NOTE — TELEPHONE ENCOUNTER
----- Message from Roseanne Barfield MD sent at 7/25/2017  5:08 PM CDT -----  yes  ----- Message -----  From: Comfort Barth RN  Sent: 7/25/2017   9:34 AM  To: Roseanne Barfield MD    Pt wants to know if she can lower to asa 81 mg? Says she has a lot of problems with bruising.     Aspirin: YES DOSE: 325 mg- VASC US confirmed non-occlusive DVT IJV and superficial occlusive thrombus mid and distal cephalic vein. She was treated asa and heparin sq. Repeat US left UE vein 3/30/15 w no evidence of DVT. Will be discharged to rehab on ASA only.       ----- Message -----  From: Roseanne Barfield MD  Sent: 7/24/2017   7:32 PM  To: [facility]    The Labs are stable - please let patient know.

## 2017-08-08 ENCOUNTER — OFFICE VISIT (OUTPATIENT)
Dept: DERMATOLOGY | Facility: CLINIC | Age: 52
End: 2017-08-08
Payer: MEDICAID

## 2017-08-08 DIAGNOSIS — D18.00 ANGIOMA: ICD-10-CM

## 2017-08-08 DIAGNOSIS — Z94.4 HISTORY OF LIVER TRANSPLANT: ICD-10-CM

## 2017-08-08 DIAGNOSIS — L30.9 ECZEMA, UNSPECIFIED TYPE: Primary | ICD-10-CM

## 2017-08-08 PROCEDURE — 99999 PR PBB SHADOW E&M-EST. PATIENT-LVL III: CPT | Mod: PBBFAC,,, | Performed by: DERMATOLOGY

## 2017-08-08 PROCEDURE — 99213 OFFICE O/P EST LOW 20 MIN: CPT | Mod: S$PBB,,, | Performed by: DERMATOLOGY

## 2017-08-08 PROCEDURE — 3008F BODY MASS INDEX DOCD: CPT | Mod: ,,, | Performed by: DERMATOLOGY

## 2017-08-08 PROCEDURE — 99213 OFFICE O/P EST LOW 20 MIN: CPT | Mod: PBBFAC | Performed by: DERMATOLOGY

## 2017-08-08 RX ORDER — ARIPIPRAZOLE 20 MG/1
TABLET ORAL
Refills: 3 | COMMUNITY
Start: 2017-07-17

## 2017-08-08 NOTE — PROGRESS NOTES
Subjective:       Patient ID:  Gi Waters is a 51 y.o. female who presents for   Chief Complaint   Patient presents with    Follow-up     Rash     HPI 52 yo F with history of eczema and on immunosuppression following liver transplant for acute HBV here for f/u. Last seen 6/6/17 and instructed on moisturizing and given Lidex 0.05% cream to use BID.  She reports that her rash has mostly resolved.  She does complain of some dry itchy patches on her bilateral lower legs/medial ankles. She moisturizes with CeraVe cream and is bathing with Dial soap.  Uses Tide Free and Clear detergent.  Denies any nonhealing/ulcerating lesions.    Past Medical History:   Diagnosis Date    Allergy     seasonal    Bipolar 1 disorder     Blood clotting tendency     Hair thinning 7/27/2015    History of cholecystectomy 3/12/2015    History of gastric bypass 3/12/2015    Hypertension     Rib fracture 10/26/2015    Ulcer      Review of Systems   Skin: Positive for itching, dry skin, daily sunscreen use and recent sunburn.   Hematologic/Lymphatic: Bruises/bleeds easily.        Objective:    Physical Exam   Constitutional: She appears well-developed and well-nourished. No distress.   Neurological: She is alert and oriented to person, place, and time. She is not disoriented.   Psychiatric: She has a normal mood and affect.   Skin:   Areas Examined (abnormalities noted in diagram):   Head / Face Inspection Performed  Neck Inspection Performed  Chest / Axilla Inspection Performed  Back Inspection Performed  RUE Inspected  LUE Inspection Performed  RLE Inspected  LLE Inspection Performed              Diagram Legend     Erythematous scaling macule/papule c/w actinic keratosis       Vascular papule c/w angioma      Pigmented verrucoid papule/plaque c/w seborrheic keratosis      Yellow umbilicated papule c/w sebaceous hyperplasia      Irregularly shaped tan macule c/w lentigo     1-2 mm smooth white papules consistent with Milia       Movable subcutaneous cyst with punctum c/w epidermal inclusion cyst      Subcutaneous movable cyst c/w pilar cyst      Firm pink to brown papule c/w dermatofibroma      Pedunculated fleshy papule(s) c/w skin tag(s)      Evenly pigmented macule c/w junctional nevus     Mildly variegated pigmented, slightly irregular-bordered macule c/w mildly atypical nevus      Flesh colored to evenly pigmented papule c/w intradermal nevus       Pink pearly papule/plaque c/w basal cell carcinoma      Erythematous hyperkeratotic cursted plaque c/w SCC      Surgical scar with no sign of skin cancer recurrence      Open and closed comedones      Inflammatory papules and pustules      Verrucoid papule consistent consistent with wart     Erythematous eczematous patches and plaques     Dystrophic onycholytic nail with subungual debris c/w onychomycosis     Umbilicated papule    Erythematous-base heme-crusted tan verrucoid plaque consistent with inflamed seborrheic keratosis     Erythematous Silvery Scaling Plaque c/w Psoriasis     See annotation      Assessment / Plan:        Eczema, unspecified type  Improved s/p lidex cream and moisturizing  Continue gentle skin care routine  Continue lidex cream prn. I discussed the side effects of topical steroids including atrophy, telangiectasias, striae.  Avoid use on the face and contact with the eyes    Liver transplant 3/15/2015 for acute HBV/Prophylactic immunotherapy (transplant immunosuppression)  Discussed sun protection and increased risk of skin cancer in organ transplant patients was provided to the patient  [facility]            Return for June 2018 for skin check.

## 2017-08-29 ENCOUNTER — TELEPHONE (OUTPATIENT)
Dept: TRANSPLANT | Facility: CLINIC | Age: 52
End: 2017-08-29

## 2017-08-29 NOTE — TELEPHONE ENCOUNTER
Returned call to pt. She just found out that she has genital herpes and is currently being treated by her GYN. She wanted to let Comfort and Dr. Barfield know that this was going on. She will continue to f/u with her gyn for mgmt.

## 2017-08-30 ENCOUNTER — PATIENT MESSAGE (OUTPATIENT)
Dept: TRANSPLANT | Facility: CLINIC | Age: 52
End: 2017-08-30

## 2017-08-30 ENCOUNTER — TELEPHONE (OUTPATIENT)
Dept: HEPATOLOGY | Facility: CLINIC | Age: 52
End: 2017-08-30

## 2017-08-30 ENCOUNTER — TELEPHONE (OUTPATIENT)
Dept: TRANSPLANT | Facility: CLINIC | Age: 52
End: 2017-08-30

## 2017-08-30 NOTE — TELEPHONE ENCOUNTER
Notified Dr. Barfield that patient has been diagnosed with genital herpes and placed on valganciclovir per local MD. instructed pt Dr. Barfield states to hold cellcept while being treated. Will repeat labs at end of treatment on 9/12. She verbalizes understanding.

## 2017-08-30 NOTE — TELEPHONE ENCOUNTER
Stop cellcept      ----- Message from Comfort Barth RN sent at 8/30/2017 11:15 AM CDT -----  Just FYI- I got a call that she has genital herpes. She is being treated with acyclovir.

## 2017-08-30 NOTE — TELEPHONE ENCOUNTER
----- Message from Kim Robison sent at 8/30/2017 12:33 PM CDT -----  Contact: pt  Needs to speak with Comfort said you would know what it is about please call her @ # [phone].

## 2017-09-12 LAB
EXT ALBUMIN: 3.4
EXT ALKALINE PHOSPHATASE: 100
EXT ALT: 44
EXT AST: 31
EXT BILIRUBIN TOTAL: 0.4
EXT BUN: 25
EXT CALCIUM: 9.5
EXT CHLORIDE: 108
EXT CO2: 27
EXT CREATININE: 1.1 MG/DL
EXT EOSINOPHIL%: 3.4
EXT GLUCOSE: 98
EXT HEMATOCRIT: 38.2
EXT HEMOGLOBIN: 11.9
EXT LYMPH%: 31.5
EXT MONOCYTES%: 11.4
EXT PLATELETS: 255
EXT POTASSIUM: 4.2
EXT PROTEIN TOTAL: 5.8
EXT SEGS%: 52.8
EXT SODIUM: 143 MMOL/L
EXT TACROLIMUS LVL: 7.2
EXT WBC: 3.5

## 2017-09-17 ENCOUNTER — PATIENT MESSAGE (OUTPATIENT)
Dept: TRANSPLANT | Facility: CLINIC | Age: 52
End: 2017-09-17

## 2017-09-18 ENCOUNTER — TELEPHONE (OUTPATIENT)
Dept: TRANSPLANT | Facility: CLINIC | Age: 52
End: 2017-09-18

## 2017-09-18 NOTE — TELEPHONE ENCOUNTER
----- Message from Kerwin Ma sent at 9/18/2017  8:51 AM CDT -----  Would like a call regarding medication assistance.    Call [phone]       SW returned pts call, no answer.  ELIZABETH lvm for patient to call back regarding prescription and insurance issues.  SW remains available.

## 2017-09-19 ENCOUNTER — TELEPHONE (OUTPATIENT)
Dept: TRANSPLANT | Facility: CLINIC | Age: 52
End: 2017-09-19

## 2017-09-19 ENCOUNTER — PATIENT MESSAGE (OUTPATIENT)
Dept: TRANSPLANT | Facility: CLINIC | Age: 52
End: 2017-09-19

## 2017-09-19 NOTE — TELEPHONE ENCOUNTER
SW followed-up with pt this morning via phone regarding insurance changes and medication needs.   Pt reports that she is back working and social security has found her to no longer be disabled.  ELIZABETH went through insurances with pt.  When she had her transplant she was insured by [employer], then lost  and insured by Medicaid.  She lost Medicaid and insured by Medicare only until the end of September.    As of October 1, 2017, pt will by insured by [facility] through her employer.       Pt reports that for the 8 days that she will need prescriptions through Medicare benefits, her pharmacy and fiance have agreed to assist her with costs.    Pt denies any psychosocial needs in regards to medications at this time.   Pt denies any coping issues at this time.   SW will continue to follow for all transplant resources, education and psychosocial support.

## 2017-09-19 NOTE — TELEPHONE ENCOUNTER
----- Message from Roseanne Barfield MD sent at 9/19/2017 11:17 AM CDT -----  wait  ----- Message -----  From: Comfort Barth RN  Sent: 9/18/2017   9:16 AM  To: Roseanne Barfield MD    If she has finished treatment for herpes do you want her to restart cellcept or wait?   ----- Message -----  From: Roseanne Barfield MD  Sent: 9/17/2017  11:47 AM  To: [facility]    Alt 44- repeat in one month - please let patient know.

## 2017-10-02 ENCOUNTER — OFFICE VISIT (OUTPATIENT)
Dept: TRANSPLANT | Facility: CLINIC | Age: 52
End: 2017-10-02
Payer: COMMERCIAL

## 2017-10-02 ENCOUNTER — TELEPHONE (OUTPATIENT)
Dept: PHARMACY | Facility: CLINIC | Age: 52
End: 2017-10-02

## 2017-10-02 ENCOUNTER — HOSPITAL ENCOUNTER (OUTPATIENT)
Dept: RADIOLOGY | Facility: HOSPITAL | Age: 52
Discharge: HOME | End: 2017-10-02
Attending: INTERNAL MEDICINE
Payer: COMMERCIAL

## 2017-10-02 VITALS
HEIGHT: 64 IN | OXYGEN SATURATION: 100 % | DIASTOLIC BLOOD PRESSURE: 101 MMHG | RESPIRATION RATE: 16 BRPM | WEIGHT: 128.5 LBS | HEART RATE: 66 BPM | TEMPERATURE: 98 F | BODY MASS INDEX: 21.94 KG/M2 | SYSTOLIC BLOOD PRESSURE: 133 MMHG

## 2017-10-02 DIAGNOSIS — Z29.89 PROPHYLACTIC IMMUNOTHERAPY: Chronic | ICD-10-CM

## 2017-10-02 DIAGNOSIS — K83.8 DILATED BILE DUCT: ICD-10-CM

## 2017-10-02 DIAGNOSIS — B18.1 CHRONIC HEPATITIS B: ICD-10-CM

## 2017-10-02 DIAGNOSIS — Z94.4 S/P LIVER TRANSPLANT: Primary | ICD-10-CM

## 2017-10-02 DIAGNOSIS — Z94.4 LIVER REPLACED BY TRANSPLANT: Chronic | ICD-10-CM

## 2017-10-02 DIAGNOSIS — Z94.4 S/P LIVER TRANSPLANT: ICD-10-CM

## 2017-10-02 DIAGNOSIS — Z98.84 HISTORY OF GASTRIC BYPASS: ICD-10-CM

## 2017-10-02 PROCEDURE — 99999 PR PBB SHADOW E&M-EST. PATIENT-LVL IV: CPT | Mod: PBBFAC,,, | Performed by: INTERNAL MEDICINE

## 2017-10-02 PROCEDURE — 93975 VASCULAR STUDY: CPT | Mod: TC

## 2017-10-02 PROCEDURE — 93975 VASCULAR STUDY: CPT | Mod: 26,,, | Performed by: RADIOLOGY

## 2017-10-02 PROCEDURE — 76705 ECHO EXAM OF ABDOMEN: CPT | Mod: 26,59,, | Performed by: RADIOLOGY

## 2017-10-02 PROCEDURE — 99215 OFFICE O/P EST HI 40 MIN: CPT | Mod: S$GLB,,, | Performed by: INTERNAL MEDICINE

## 2017-10-02 RX ORDER — TENOFOVIR DISOPROXIL FUMARATE 300 MG/1
300 TABLET, FILM COATED ORAL DAILY
Qty: 90 TABLET | Refills: 3 | Status: SHIPPED | OUTPATIENT
Start: 2017-10-02 | End: 2017-10-03

## 2017-10-02 RX ORDER — MYCOPHENOLATE MOFETIL 250 MG/1
500 CAPSULE ORAL 2 TIMES DAILY
Refills: 3 | COMMUNITY
Start: 2017-09-14 | End: 2017-10-04 | Stop reason: ALTCHOICE

## 2017-10-02 NOTE — PATIENT INSTRUCTIONS
1 fill viread here today  2. Blood tests today  3. Us today  4. Do blood tests weekly to f/u on hepatitis B  5. N/V may be due to cellcept - I will check labs and see if I can stop  Return 3 months

## 2017-10-02 NOTE — LETTER
October 2, 2017        Tracie Armenta  [address] [place] [address]  Phone: [phone]  Fax: [phone]             [facility]  [address]  [facility] [address]  Phone: [phone]   Patient: Gi Waters   MR Number: [id number]   YOB: 1965   Date of Visit: 10/2/2017       Dear Dr. Tracie Armenta    Thank you for referring Gi Waters to me for evaluation. Attached you will find relevant portions of my assessment and plan of care.    If you have questions, please do not hesitate to call me. I look forward to following Gi Waters along with you.    Sincerely,    Roseanne Barfield MD    Enclosure    If you would like to receive this communication electronically, please contact [email or web address] or [phone] to request [employer] access.    [employer] is a tool which provides read-only access to select patient information with whom you have a relationship. Its easy to use and provides real time access to review your patients record including encounter summaries, notes, results, and demographic information.    If you feel you have received this communication in error or would no longer like to receive these types of communications, please e-mail [email or web address]

## 2017-10-02 NOTE — PROGRESS NOTES
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    ORGAN: LIVER  Whole or Partial: whole liver  Donor Type:  - brain death  CDC High Risk: no  Donor CMV Status: positive  Donor HCV Status: negative  Donor HBcAb: negative  Biliary Anastomosis: end to end  Arterial Anatomy: completely replaced circulation  IVC reconstruction: end to end ivc  Portal vein status: patent    She has had the following complications since transplant: none    Subjective:     Interval History: Gi is now 2 1/2 years post liver transplant. She is in the office today with her fiance. Currently, she is doing well, with the exception of recurrence of N/V. Although she had previously had N/V that resolved she is now symptomatic and thinks it is related to her gastric bypass surgery history and overeating.    Liver enzymes are low. She is on prograf and cellcept. Unfortunately she had a lapse in insurance and she ran out of viread x 1 week. Not yet out of prograf and cellcept. Now has health insurance as of today and should be able to get the meds.     Apparently she had an episode of herpes that was treated with an antiviral. However, the diagnosis is now in question.    An MRCP was done previously for mildly dilated ducts both extra and intra hepatic. Because her enzymes were normal, no ERCP was recommended. Labs remain normal.    U/s with doppler in 17 shows stable dilation of the ducts and with an interval increase in hepatic arterial resistive indices, now measuring at the upper limits of normal.     She had a colonoscopy earlier this year that was normal by report. Repeat recommended in .    Review of Systems   Constitutional: Negative.    HENT: Negative.    Eyes: Negative.    Respiratory: Negative.    Cardiovascular: Negative.     Genitourinary: Negative.    Musculoskeletal: Negative.    Skin: Negative.    Neurological: Negative.    Psychiatric/Behavioral: Negative.        Objective:     Physical Exam   Constitutional: She is oriented to person, place, and time. She appears well-developed and well-nourished.   HENT:   Head: Normocephalic and atraumatic.   Eyes: Conjunctivae and EOM are normal. Pupils are equal, round, and reactive to light. No scleral icterus.   Neck: Normal range of motion. Neck supple. No thyromegaly present.   Cardiovascular: Normal rate, regular rhythm and normal heart sounds.    Pulmonary/Chest: Effort normal and breath sounds normal. She has no rales.   Abdominal: Soft. Bowel sounds are normal. She exhibits no distension and no mass. There is no tenderness.   Musculoskeletal: Normal range of motion. She exhibits no edema.   Neurological: She is alert and oriented to person, place, and time.   Skin: Skin is warm and dry. No rash noted.   Psychiatric: She has a normal mood and affect.   Vitals reviewed.    Lab Results   Component Value Date    BILITOT 0.4 05/30/2016    AST 26 05/30/2016    ALT 33 05/30/2016    ALKPHOS 58 05/30/2016    CREATININE 1.4 05/30/2016    ALBUMIN 3.8 05/30/2016     Lab Results   Component Value Date    WBC 5.78 05/30/2016    HGB 11.6 (L) 05/30/2016    HCT 36.4 (L) 05/30/2016    HCT 28 (L) 03/15/2015     05/30/2016     Lab Results   Component Value Date    TACROLIMUS 10.5 05/30/2016       Assessment/Plan:       The patient is now ~2 1/2 years post liver transplant with imporved allograft function. My current recommendations:  1. Nausea with vomiting- recurrence- check labs today and if normal liver function then stop cellcept. If does not resolve- to see local GI.  2. S/p liver transplant and control of IS: continue: prograf and stop cellcept if liver numbers normal today. Target trough 5-7. Repeat us today to f/u on resistive indices noted to be ULN on last doppler us.  3. Dilated ducts:  stable and no elevated ALKP- no ercp for now.   4. HBV: undetectable viral load but has not taken it in one week: restart viread; now off HBIG per protocol; change viread to tenofovir alafenamide as tolerated at next visit. Weekly labs until back on viread  5. Bone density in 2018.  6.  Health maintenance: to see a dermatologist annually to screen for skin cancer, perform regular colonoscopies (due next in 2027)  and mammograms. She has had a hysterectomy and has no cervix. Therefore no need for a pap.     Return 3 months    Roseanne Barfield MD           [facility] Patient Status  Functional Status: 90% - Able to carry on normal activity: minor symptoms of disease  Physical Capacity: Limited Mobility        . . . . . .

## 2017-10-03 ENCOUNTER — TELEPHONE (OUTPATIENT)
Dept: TRANSPLANT | Facility: CLINIC | Age: 52
End: 2017-10-03

## 2017-10-03 DIAGNOSIS — Z94.4 LIVER REPLACED BY TRANSPLANT: Primary | ICD-10-CM

## 2017-10-03 RX ORDER — ENTECAVIR 0.5 MG/1
0.5 TABLET, FILM COATED ORAL DAILY
Qty: 30 TABLET | Refills: 11 | Status: SHIPPED | OUTPATIENT
Start: 2017-10-03 | End: 2017-10-06

## 2017-10-03 NOTE — TELEPHONE ENCOUNTER
----- Message from Stevenson Alba sent at 10/3/2017  3:26 PM CDT -----  Contact: patient  Patient called about the prescription that was fax over to the pharmacy.      Please call [phone]      Thanks!!!

## 2017-10-03 NOTE — TELEPHONE ENCOUNTER
Called and spoke with pt to let her know that her U/S was stable. No changes needed. Dr. Barfield would like her to repeat labs weekly until she gets back on Viread. Pt stated she has moved, and will call back with lab information.

## 2017-10-03 NOTE — TELEPHONE ENCOUNTER
DOCUMENTATION ONLY:  Prior authorization is NOT required for Viread.     Co-pay: $1131.04 (Deductible)     Patient Assistance IS required.     Forward to patient assistance for review.

## 2017-10-03 NOTE — TELEPHONE ENCOUNTER
----- Message from Roseanne Barfield MD sent at 10/2/2017  8:44 PM CDT -----  us stable - please let patient know.

## 2017-10-03 NOTE — TELEPHONE ENCOUNTER
Called and spoke with pt to let her know Dr. Barfield would like her to start taking entecavir 0.5 mg daily. Pt repeated and verbalized understanding.    Viread discontinued due to pt unable to afford medication.

## 2017-10-03 NOTE — TELEPHONE ENCOUNTER
Received telephone order from Dr. Barfield to have pt begin takin entecavir 0.5 mg daily. Viread discontinued due to pt not being able to afford medication.     Pt called to notify me that the entecaivr was expensive as well, and she cannot afford it. Dr. Barfield notified. Awaiting further instruction. Message sent to Jess Whiteside to contact pt regarding financial assistance for her medication.

## 2017-10-04 ENCOUNTER — TELEPHONE (OUTPATIENT)
Dept: TRANSPLANT | Facility: CLINIC | Age: 52
End: 2017-10-04

## 2017-10-04 NOTE — TELEPHONE ENCOUNTER
----- Message from Jess Whiteside sent at 10/4/2017 10:34 AM CDT -----  I talked with Mrs. Waters. She expressed that she does have insurance coverage for her meds however, she has a $2500 deductible that just begun. She says she doesn't know how she will it this deductible in the future and that she has considered quitting her job and drawing Soc Sec and apply for state Medicaid.    Not only is she asking about her newly prescribed entecavir (Baraclude) that is now $900 she is also asking about Prograf(tacro) that is $1400. I am preparing to send her a PAP and an application for the Baraclude, however she is aware that she may or may not qualify for assistance.    Jess  ----- Message -----  From: Cristina Vasquez RN  Sent: 10/2/2017   3:09 PM  To: Jess Whiteside    Pt cannot afford Viread, and would like some assistance. Thank you.

## 2017-10-04 NOTE — TELEPHONE ENCOUNTER
Called and spoke with pt to let her know that Dr. Barfield would like her to discontinue Cellcept. Repeat labs Mon. 10/9. Pt repeated and verbalized understanding.

## 2017-10-06 ENCOUNTER — TELEPHONE (OUTPATIENT)
Dept: TRANSPLANT | Facility: CLINIC | Age: 52
End: 2017-10-06

## 2017-10-06 RX ORDER — LAMIVUDINE 150 MG/1
150 TABLET, FILM COATED ORAL DAILY
Qty: 30 TABLET | Refills: 11 | Status: SHIPPED | OUTPATIENT
Start: 2017-10-06 | End: 2017-11-22 | Stop reason: ALTCHOICE

## 2017-10-06 NOTE — TELEPHONE ENCOUNTER
----- Message from Cornelia Dunlap sent at 10/6/2017 12:14 PM CDT -----  Contact: Minh with [facility]  Lab would like clarification on orders that were a faxed over    Contact number [phone]  Thanks

## 2017-10-06 NOTE — TELEPHONE ENCOUNTER
Called and left pt voicemail to let her know that Dr. Barfield ordered another Hep. B medication that should be inexpensive, and would like her to return my call ASAP so that I know which pharmacy she would like me to send prescription to.

## 2017-10-06 NOTE — TELEPHONE ENCOUNTER
----- Message from Stevenson Alba sent at 10/6/2017 11:07 AM CDT -----  Contact: patient  Patient returning a call.    Please call [phone] or [phone]      Happy Friday..have a blessed day!!

## 2017-10-09 ENCOUNTER — PATIENT MESSAGE (OUTPATIENT)
Dept: TRANSPLANT | Facility: CLINIC | Age: 52
End: 2017-10-09

## 2017-10-10 ENCOUNTER — TELEPHONE (OUTPATIENT)
Dept: TRANSPLANT | Facility: CLINIC | Age: 52
End: 2017-10-10
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
## 2017-10-14 ENCOUNTER — PATIENT MESSAGE (OUTPATIENT)
Dept: TRANSPLANT | Facility: CLINIC | Age: 52
End: 2017-10-14

## 2017-10-23 ENCOUNTER — PATIENT MESSAGE (OUTPATIENT)
Dept: TRANSPLANT | Facility: CLINIC | Age: 52
End: 2017-10-23

## 2017-10-23 ENCOUNTER — TELEPHONE (OUTPATIENT)
Dept: TRANSPLANT | Facility: CLINIC | Age: 52
End: 2017-10-23

## 2017-10-23 NOTE — TELEPHONE ENCOUNTER
Sent pt message via portal to see if she was ever able to get her medication, and to ask when she will be able to get her labs done. Awaiting response.

## 2017-10-27 NOTE — TELEPHONE ENCOUNTER
FOR DOCUMENTATION ONLY:  Financial Assistance for Viread approved from 10-26-17 to 1-24-18  Source [employer] Adv[employer] Access Trial Card  BIN: [id number]  PCN: [id number]  Id: [id number]  GRP: [id number]

## 2017-10-29 ENCOUNTER — PATIENT MESSAGE (OUTPATIENT)
Dept: TRANSPLANT | Facility: CLINIC | Age: 52
End: 2017-10-29

## 2017-10-30 ENCOUNTER — PATIENT MESSAGE (OUTPATIENT)
Dept: TRANSPLANT | Facility: CLINIC | Age: 52
End: 2017-10-30

## 2017-11-09 ENCOUNTER — TELEPHONE (OUTPATIENT)
Dept: TRANSPLANT | Facility: CLINIC | Age: 52
End: 2017-11-09

## 2017-11-09 NOTE — TELEPHONE ENCOUNTER
Pt asking to speak with Jess Whiteside regarding financial assistance with Prograf. Message sent to Jess Whiteside. Pt also stated she has not been able to have lab work done, but will go as soon as she can.

## 2017-11-09 NOTE — TELEPHONE ENCOUNTER
----- Message from Kim Robison sent at 11/9/2017  9:50 AM CST -----  Contact: pt  Calling to speak with Cristina about her prograf please call her @ # [phone].

## 2017-11-17 ENCOUNTER — PATIENT MESSAGE (OUTPATIENT)
Dept: TRANSPLANT | Facility: CLINIC | Age: 52
End: 2017-11-17

## 2017-11-21 ENCOUNTER — TELEPHONE (OUTPATIENT)
Dept: TRANSPLANT | Facility: CLINIC | Age: 52
End: 2017-11-21

## 2017-11-21 ENCOUNTER — PATIENT MESSAGE (OUTPATIENT)
Dept: TRANSPLANT | Facility: CLINIC | Age: 52
End: 2017-11-21

## 2017-11-21 DIAGNOSIS — Z94.4 LIVER REPLACED BY TRANSPLANT: Primary | ICD-10-CM

## 2017-11-21 NOTE — TELEPHONE ENCOUNTER
----- Message from Roseanne Barfield MD sent at 11/21/2017 12:39 PM CST -----  Please talk to rep for prograf to see if we can get a month supply. In the meantime ask her to buy prednisone 20 mg daily. Does she have meds for the hepatitis b? Start cellcept 500 mg bid  ----- Message -----  From: Cristina Vasquez RN  Sent: 11/21/2017  11:33 AM  To: Roseanne Barfield MD    Pt called and stated she will be out of Prograf by the end of this week, and is enrolled in a program to get free Prograf, but it will not ship out until next month. She said she has some Cellcept left, and was wondering if she could restart that until she gets her Prograf. Also, she is not able to afford to have her labs done, and is currently uninsured. She stated she will be getting insurance, but not until January 1.

## 2017-11-21 NOTE — TELEPHONE ENCOUNTER
Received phone call from pt stating that she will be out of Prograf by the end of this week, and was able to enroll in program to receive free Prograf, but it will not ship out until next month. Pt also stated that she cannot afford to have labs drawn at this time. She does not currently have insurance, but will be getting insurance effective January 1. Message sent to social work, and to Dr. Barfield.

## 2017-11-22 RX ORDER — TENOFOVIR DISOPROXIL FUMARATE 300 MG/1
300 TABLET, FILM COATED ORAL DAILY
Qty: 30 TABLET | Refills: 11 | Status: SHIPPED | OUTPATIENT
Start: 2017-11-22 | End: 2017-11-29 | Stop reason: SDUPTHER

## 2017-11-22 RX ORDER — PREDNISONE 20 MG/1
20 TABLET ORAL DAILY
Qty: 30 TABLET | Refills: 1 | Status: SHIPPED | OUTPATIENT
Start: 2017-11-22 | End: 2017-12-02

## 2017-11-22 NOTE — TELEPHONE ENCOUNTER
----- Message from Gi Leyva RN sent at 11/21/2017  4:58 PM CST -----  Contact: patient       ----- Message -----  From: Stevenson Alba  Sent: 11/21/2017   4:51 PM  To: [facility]    Patient returning a call about her medication       Please call [phone]        Thanks!!!

## 2017-11-22 NOTE — TELEPHONE ENCOUNTER
Called and spoke with pt regarding medications. Advised that until she receives Prograf, she needs to start taking prednisone 20 mg daily, and Cellcept 500 mg BID. Needs to repeat labs as soon as possible. Pt repeated and verbalized understanding.     Message sent to social workers to reach out to pt to possible find a lab where pt may be able to get labs drawn for free, as pt is unable to afford to have labs drawn at this time. Pt stated that she was able to receive Viread for her Hep. B for free, and is currently taking Viread 300 mg daily. Dr. Barfield notified.

## 2017-11-24 ENCOUNTER — TELEPHONE (OUTPATIENT)
Dept: PHARMACY | Facility: HOSPITAL | Age: 52
End: 2017-11-24

## 2017-11-24 ENCOUNTER — TELEPHONE (OUTPATIENT)
Dept: TRANSPLANT | Facility: CLINIC | Age: 52
End: 2017-11-24

## 2017-11-24 ENCOUNTER — TELEPHONE (OUTPATIENT)
Dept: TRANSPLANT | Facility: HOSPITAL | Age: 52
End: 2017-11-24
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
## 2017-11-24 NOTE — TELEPHONE ENCOUNTER
Informed patient [facility] received a prescription for Viread and we will contact their insurance company to find out if the medication is covered. We will update patient of status as more information is received. feel free to give us a call with  any questions at [phone].

## 2017-11-26 ENCOUNTER — TELEPHONE (OUTPATIENT)
Dept: TRANSPLANT | Facility: HOSPITAL | Age: 52
End: 2017-11-26
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
## 2017-11-27 ENCOUNTER — TELEPHONE (OUTPATIENT)
Dept: TRANSPLANT | Facility: CLINIC | Age: 52
End: 2017-11-27

## 2017-11-27 NOTE — TELEPHONE ENCOUNTER
SW f/up with patient this morning regarding prograf assistance.   ELIZABETH and PAP Coord. MARICRUZ Whiteside investigated why it would be 30 days before pt received assistance through [employer], they do not have patient enrolled in any assistance programs.   Upon discussing with patient, she got in touch with [employer] [employer], which offered her a discount on Prograf and another medication for $85 a month and it would be 30 days before pt would receive medications.    Pt verbalized having a PAP given to her by MARICRUZ Whiteside previously, she will get this together and fax tomorrow.   SW asked for a call back when patient faxing to MARICRUZ Whiteside.   In the mean time the hepatologist has altered pts immunos medications.    SW investigating cost of prograf through [facility] for any additional assistance that can be provided to the patient.   Kathi Bailey with [facility] will be able to get pt a 2 week voucher for assistance with Prograf.   SW sending msg to pts post liver nurse coord. For needed prescription.    ELIZABETH Remains available for all transplant resources, education and psychosocial support.
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
## 2017-11-28 ENCOUNTER — TELEPHONE (OUTPATIENT)
Dept: TRANSPLANT | Facility: CLINIC | Age: 52
End: 2017-11-28

## 2017-11-28 ENCOUNTER — TELEPHONE (OUTPATIENT)
Dept: PHARMACY | Facility: CLINIC | Age: 52
End: 2017-11-28

## 2017-11-28 ENCOUNTER — PATIENT MESSAGE (OUTPATIENT)
Dept: TRANSPLANT | Facility: CLINIC | Age: 52
End: 2017-11-28

## 2017-11-28 DIAGNOSIS — Z94.4 LIVER REPLACED BY TRANSPLANT: Primary | ICD-10-CM

## 2017-11-28 NOTE — TELEPHONE ENCOUNTER
Patient returned SW call today to let us know that she will have to mail the PAP application and documents to MARICRUZ brown.   She was unable to find a fax machine to send [place].       ELIZABETH attempting to investigate other measures for more immediate assistance for patient for Prograf.  The [facility] is unable to mail medications out of state as they do not have a [place] pharmacy license.   ELIZABETH will continue to pursue avenues to get patient Prograf assistance.   ELIZABETH remains available for all transplant resources, education and psychosocial support.

## 2017-11-28 NOTE — TELEPHONE ENCOUNTER
FOR DOCUMENTATION ONLY:  Financial Assistance for Viread approved from 10-26-17 to 10-26-18  Source [employer]  [id number]

## 2017-11-28 NOTE — TELEPHONE ENCOUNTER
----- Message from Kim Robison sent at 11/28/2017  8:38 AM CST -----  Contact: pt  Calling to speak with Wendy please call her @ # [phone]

## 2017-11-29 ENCOUNTER — TELEPHONE (OUTPATIENT)
Dept: TRANSPLANT | Facility: CLINIC | Age: 52
End: 2017-11-29

## 2017-11-29 ENCOUNTER — TELEPHONE (OUTPATIENT)
Dept: TRANSPLANT | Facility: HOSPITAL | Age: 52
End: 2017-11-29

## 2017-11-29 DIAGNOSIS — Z94.4 LIVER REPLACED BY TRANSPLANT: Primary | ICD-10-CM

## 2017-11-29 DIAGNOSIS — Z94.4 LIVER REPLACED BY TRANSPLANT: ICD-10-CM

## 2017-11-29 RX ORDER — TACROLIMUS 1 MG/1
CAPSULE ORAL
Qty: 126 CAPSULE | Refills: 3 | Status: SHIPPED | OUTPATIENT
Start: 2017-11-29

## 2017-11-29 RX ORDER — TACROLIMUS 1 MG/1
CAPSULE, GELATIN COATED ORAL
Qty: 126 CAPSULE | Refills: 0 | Status: SHIPPED | OUTPATIENT
Start: 2017-11-29

## 2017-11-29 NOTE — TELEPHONE ENCOUNTER
----- Message from Gisselle Lira sent at 11/29/2017 12:13 PM CST -----  Contact: Pt  Pt calling to speak with Wendy about the address in where to send her medication to.       Call back number: [phone]

## 2017-11-29 NOTE — TELEPHONE ENCOUNTER
SW spoke with patient this morning to confirm that we received the PAP application via fax yesterday.  Informed pt we were able to obtain a voucher for a 14 day supply of prograf, this is a one time voucher issue.  Transplant Pharmacist JESSE Mitchell is sending to pts pharmacy in [place], [facility], [phone].  SW will get the rest of the 30 day supply paid for by [employer] and will have mailed to pt or pts daughters address in [place].  MARICRUZ Whiteside, PAP coordinator will be processing patient's application for prograf assistance with [employer].     Pt requesting to have medication mailed to her address in [place] and this will be forwarded to her address in [place].  [place] address: [address] [place] [id number].   Pt will call SW back if she is able to have medication mailed to her daughters home in [place], she will call after 1pm.   SW emailed [employer] Whites[employer] form and voucher to Kathi Bailey in transplant specialty pharmacy today.      Pt reports that she has an appt with local mental health clinic in [place] this Thursday in order to get abilify and klonopin medication management.   Pt reports she was picking up Viread today from her local pharmacy.      ELIZABETH remains available for all transplant resources, education and psychosocial support.

## 2017-11-29 NOTE — TELEPHONE ENCOUNTER
ELIZABETH returned call to pt who confirmed that she would like her 14 day supply of Prograf sent to her daughter's address:  [address] [place] [address] [address] [facility] [place] [address]. ELIZABETH notified both Kathi Bailey and Cata Mitchell from the transplant Pharmacy Dept. Kathi confirmed that cost for medications would be $482.36. ELIZABETH filled out on Cost Transfer Form as Edin Whiteside [facility] will pay. Approval received from ELIZABETH Supervisor, Robin Hernández. ELIZABETH emailed all information including cost transfer form to [facility]. Kathi confirmed that medication will be sent out. SW remains available.

## 2017-11-30 RX ORDER — TENOFOVIR DISOPROXIL FUMARATE 300 MG/1
300 TABLET, FILM COATED ORAL DAILY
Qty: 30 TABLET | Refills: 11 | OUTPATIENT
Start: 2017-11-30 | End: 2018-11-30

## 2017-12-01 ENCOUNTER — PATIENT MESSAGE (OUTPATIENT)
Dept: TRANSPLANT | Facility: CLINIC | Age: 52
End: 2017-12-01

## 2017-12-05 ENCOUNTER — TELEPHONE (OUTPATIENT)
Dept: TRANSPLANT | Facility: CLINIC | Age: 52
End: 2017-12-05

## 2017-12-05 NOTE — TELEPHONE ENCOUNTER
Returned pt's call. Pt wanted to let me know she had her labs done this morning. Results pending. Will call pt once I have results and they are reviewed by Dr. Barfield.

## 2017-12-07 LAB
EXT ALBUMIN: 3.6
EXT ALKALINE PHOSPHATASE: 76
EXT ALT: 26
EXT AST: 18
EXT BASOPHIL%: 0.6
EXT BILIRUBIN TOTAL: 0.3
EXT BUN: 21
EXT CALCIUM: 9
EXT CHLORIDE: 107
EXT CO2: 29
EXT CREATININE: 1.06 MG/DL
EXT EOSINOPHIL%: 1.8
EXT GFR MDRD NON AF AMER: 58
EXT GLUCOSE: 85
EXT HEMATOCRIT: 38.3
EXT HEMOGLOBIN: 12.7
EXT HEP B QUANT: <20 IU/ML
EXT HEP B S AG: NON REACTIVE
EXT LYMPH%: 40.3
EXT MONOCYTES%: 9.3
EXT PLATELETS: 285
EXT POTASSIUM: 3.7
EXT PROTEIN TOTAL: 6.5
EXT SEGS%: 48
EXT SODIUM: 141 MMOL/L
EXT TACROLIMUS LVL: 4.6
EXT WBC: 7.2

## 2017-12-08 ENCOUNTER — PATIENT MESSAGE (OUTPATIENT)
Dept: TRANSPLANT | Facility: CLINIC | Age: 52
End: 2017-12-08

## 2017-12-08 ENCOUNTER — TELEPHONE (OUTPATIENT)
Dept: TRANSPLANT | Facility: CLINIC | Age: 52
End: 2017-12-08

## 2017-12-08 NOTE — TELEPHONE ENCOUNTER
----- Message from Roseanne Barfield MD sent at 12/7/2017 10:34 PM CST -----  No change in IS until gets insurance and can have regular labs - please let patient know.

## 2017-12-08 NOTE — TELEPHONE ENCOUNTER
Sent pt message via portal to let her know that Dr. Barfield reviewed her labs, and they were stable. No medication changes. Advised to let us know when she has insurance, and can have labs done.

## 2017-12-12 ENCOUNTER — TELEPHONE (OUTPATIENT)
Dept: TRANSPLANT | Facility: CLINIC | Age: 52
End: 2017-12-12

## 2017-12-12 RX ORDER — PREDNISONE 20 MG/1
15 TABLET ORAL DAILY
COMMUNITY
Start: 2017-11-21 | End: 2018-01-18 | Stop reason: SDUPTHER

## 2017-12-12 RX ORDER — MYCOPHENOLATE MOFETIL 250 MG/1
500 CAPSULE ORAL 2 TIMES DAILY
COMMUNITY
Start: 2017-11-21 | End: 2018-01-08 | Stop reason: SDUPTHER

## 2017-12-12 NOTE — TELEPHONE ENCOUNTER
Pt has been notified to increase prograf to 5 mg bid from 5/4 mg. Pt will repeatt labs in two weeks.    Pt is currently on cellcept  500 mg bid and prednisone 20 mg daily

## 2017-12-12 NOTE — TELEPHONE ENCOUNTER
----- Message from Roseanne Barfield MD sent at 12/10/2017 10:23 AM CST -----  Increase prograf to 5/5 and repeat level in 2 weeks. I don't see that she is on cellcept or prednisone? - please let patient know.

## 2017-12-13 NOTE — TELEPHONE ENCOUNTER
----- Message from Roseanen Barfield MD sent at 12/12/2017  1:00 PM CST -----  dont think they are in the computer  ----- Message -----  From: Rossi Toribio RN  Sent: 12/12/2017  11:12 AM  To: Roseanne Barfield MD    Yes they were both added last month due to pt running low on prograf. Currently has a voucher for prograf  ----- Message -----  From: Roseanne Barfield MD  Sent: 12/10/2017  10:23 AM  To: [facility] Post-Liver Transplant Clinical    Increase prograf to 5/5 and repeat level in 2 weeks. I don't see that she is on cellcept or prednisone? - please let patient know.

## 2017-12-18 ENCOUNTER — TELEPHONE (OUTPATIENT)
Dept: TRANSPLANT | Facility: HOSPITAL | Age: 52
End: 2017-12-18

## 2017-12-18 NOTE — TELEPHONE ENCOUNTER
----- Message from Kim Robison sent at 12/18/2017 10:44 AM CST -----  Contact: pt  Needs to speak with Wendy about prograf please call pt @ # [phone].

## 2017-12-18 NOTE — TELEPHONE ENCOUNTER
ELIZABETH received call from pt who reports Dr. Dunne has changed her Prograf prescription from 5/4 to 5/5. This means pt will run out of medication prior to her refill date. ELIZABETH spoke with pt's RN Elicia Vasquez who confirmed this. LUC Rubi is under the impression that pt will have insurance effective January 1, 2018. ELIZABETH spoke with PAP Coordinator Jess Whiteside who reports pt's PAP has been approved for a year supply of Prograf. MARICRUZ Whiteside reports she will contact pt to explain that pt will be receiving automatic shipments from [facility] and discuss new dosage. No other issues or concerns noted. ELIZABETH remains available.

## 2017-12-20 ENCOUNTER — PATIENT MESSAGE (OUTPATIENT)
Dept: TRANSPLANT | Facility: CLINIC | Age: 52
End: 2017-12-20

## 2017-12-29 ENCOUNTER — TELEPHONE (OUTPATIENT)
Dept: TRANSPLANT | Facility: CLINIC | Age: 52
End: 2017-12-29

## 2017-12-29 NOTE — TELEPHONE ENCOUNTER
Called lab for test results. Pt did not go. Called and spoke with pt. Pt stated she was unable to go this week. Will go Tues. 1/2/18.

## 2018-01-04 ENCOUNTER — PATIENT MESSAGE (OUTPATIENT)
Dept: TRANSPLANT | Facility: CLINIC | Age: 53
End: 2018-01-04

## 2018-01-06 ENCOUNTER — PATIENT MESSAGE (OUTPATIENT)
Dept: TRANSPLANT | Facility: CLINIC | Age: 53
End: 2018-01-06

## 2018-01-09 ENCOUNTER — PATIENT MESSAGE (OUTPATIENT)
Dept: TRANSPLANT | Facility: CLINIC | Age: 53
End: 2018-01-09

## 2018-01-09 ENCOUNTER — TELEPHONE (OUTPATIENT)
Dept: TRANSPLANT | Facility: CLINIC | Age: 53
End: 2018-01-09

## 2018-01-09 LAB
EXT ALBUMIN: 3.9
EXT ALKALINE PHOSPHATASE: 57
EXT ALT: 18
EXT AST: 20
EXT BILIRUBIN TOTAL: 0.7
EXT BUN: 26
EXT CALCIUM: 9.1
EXT CHLORIDE: 107
EXT CO2: 29
EXT CREATININE: 1.2 MG/DL
EXT EOSINOPHIL%: 1.2
EXT GGT: 22
EXT GLUCOSE: 88
EXT HBV DNA, QUALITATIVE PCR: NOT DETECTED
EXT HEMATOCRIT: 39.9
EXT HEMOGLOBIN: 13.2
EXT HEP B S AG: NON REACTIVE
EXT LYMPH%: 37.7
EXT MONOCYTES%: 7.8
EXT PLATELETS: 298
EXT POTASSIUM: 3.4
EXT PROTEIN TOTAL: 6.6
EXT SEGS%: 52.7
EXT SODIUM: 141 MMOL/L
EXT TACROLIMUS LVL: 4.7
EXT WBC: 7.5

## 2018-01-09 RX ORDER — MYCOPHENOLATE MOFETIL 250 MG/1
500 CAPSULE ORAL 2 TIMES DAILY
Qty: 120 CAPSULE | Refills: 5 | Status: SHIPPED | OUTPATIENT
Start: 2018-01-09 | End: 2018-07-08 | Stop reason: SDUPTHER

## 2018-01-09 NOTE — TELEPHONE ENCOUNTER
----- Message from Kim Robison sent at 1/8/2018 12:25 PM CST -----  Contact: Kira/ office  No she hung up before I could ask for the phone # too guess she thought I was done  ----- Message -----  From: Comfort Barth RN  Sent: 1/8/2018  11:15 AM  To: Kim Robison    Is there an office number?  ----- Message -----  From: Kim Robison  Sent: 1/8/2018  11:05 AM  To: [facility] Post-Liver Transplant Clinical    Needs to do a referral calling  From Dr office but needs last office notes and  please fax to [facility] Kira  [phone]   Office phone #

## 2018-01-18 ENCOUNTER — PATIENT MESSAGE (OUTPATIENT)
Dept: TRANSPLANT | Facility: CLINIC | Age: 53
End: 2018-01-18

## 2018-01-18 ENCOUNTER — TELEPHONE (OUTPATIENT)
Dept: TRANSPLANT | Facility: CLINIC | Age: 53
End: 2018-01-18

## 2018-01-18 RX ORDER — PREDNISONE 10 MG/1
TABLET ORAL
Qty: 50 TABLET | Refills: 0 | Status: SHIPPED | OUTPATIENT
Start: 2018-01-18

## 2018-01-18 NOTE — TELEPHONE ENCOUNTER
----- Message from Cornelia Dunlap sent at 1/18/2018  4:50 PM CST -----  Contact: [facility]  [facility] would like to clarification on the script they received for Prednisone 10mg    Contact number [phone]  Thanks

## 2018-01-18 NOTE — TELEPHONE ENCOUNTER
Informed pt labs reviewed, instructed pt to start to wean her prednisone down will  decrease pred to 15 mg x 1 week then down to 10 mg x 1 week then 7.5 mg x 1 week then 5 mg. Will repeat labs 2/5.

## 2018-01-18 NOTE — TELEPHONE ENCOUNTER
----- Message from Roseanne Barfield MD sent at 1/18/2018 11:46 AM CST -----  She has insurance now? If so then decrease pred to 15 mg x 1 week then down to 10 mg x 1 week then 7.5 mg x 1 week then 5 mg  ----- Message -----  From: Comfort Barth RN  Sent: 1/18/2018  10:38 AM  To: Roseanne Barfield MD    Just checking if you wanted to keep her immunosuppression the same? She is on pred 20 , cellcept 500 mg BID and FK 5/4.     Thanks

## 2018-01-24 ENCOUNTER — PATIENT MESSAGE (OUTPATIENT)
Dept: TRANSPLANT | Facility: CLINIC | Age: 53
End: 2018-01-24

## 2018-02-07 ENCOUNTER — PATIENT MESSAGE (OUTPATIENT)
Dept: TRANSPLANT | Facility: CLINIC | Age: 53
End: 2018-02-07

## 2018-02-22 ENCOUNTER — PATIENT MESSAGE (OUTPATIENT)
Dept: TRANSPLANT | Facility: CLINIC | Age: 53
End: 2018-02-22

## 2018-02-27 ENCOUNTER — TELEPHONE (OUTPATIENT)
Dept: TRANSPLANT | Facility: CLINIC | Age: 53
End: 2018-02-27

## 2018-02-27 NOTE — TELEPHONE ENCOUNTER
Received message from patient:     Since I have moved to [place], I have transferred care to [facility] in [place].  I saw Dr. Lyn Agosto on February 13, 2018.  They are retrieving my records from [facility].  I will be following up with them.  [place] is just too far to travel now.  Thank you for everything.

## 2018-07-08 RX ORDER — MYCOPHENOLATE MOFETIL 250 MG/1
CAPSULE ORAL
Qty: 120 CAPSULE | Refills: 5 | Status: SHIPPED | OUTPATIENT
Start: 2018-07-08

## 2022-05-11 ENCOUNTER — PATIENT MESSAGE (OUTPATIENT)
Dept: RESEARCH | Facility: CLINIC | Age: 57
End: 2022-05-11